# Patient Record
Sex: MALE | Race: WHITE | Employment: FULL TIME | URBAN - METROPOLITAN AREA
[De-identification: names, ages, dates, MRNs, and addresses within clinical notes are randomized per-mention and may not be internally consistent; named-entity substitution may affect disease eponyms.]

---

## 2024-07-09 ENCOUNTER — HOSPITAL ENCOUNTER (EMERGENCY)
Age: 34
Discharge: HOME | End: 2024-07-09
Attending: EMERGENCY MEDICINE

## 2024-07-09 ENCOUNTER — APPOINTMENT (OUTPATIENT)
Dept: GENERAL RADIOLOGY | Age: 34
End: 2024-07-09

## 2024-07-09 VITALS
OXYGEN SATURATION: 97 % | HEIGHT: 72 IN | BODY MASS INDEX: 24.38 KG/M2 | HEART RATE: 74 BPM | SYSTOLIC BLOOD PRESSURE: 134 MMHG | RESPIRATION RATE: 18 BRPM | TEMPERATURE: 98.1 F | DIASTOLIC BLOOD PRESSURE: 103 MMHG | WEIGHT: 180 LBS

## 2024-07-09 DIAGNOSIS — M10.9 ACUTE GOUT OF LEFT ANKLE, UNSPECIFIED CAUSE: Primary | ICD-10-CM

## 2024-07-09 LAB
ALBUMIN SERPL-MCNC: 4.2 G/DL (ref 3.5–5.2)
ALP SERPL-CCNC: 95 U/L (ref 40–129)
ALT SERPL-CCNC: 34 U/L (ref 0–40)
ANION GAP SERPL CALCULATED.3IONS-SCNC: 12 MMOL/L (ref 7–16)
AST SERPL-CCNC: 43 U/L (ref 0–39)
BILIRUB SERPL-MCNC: 0.8 MG/DL (ref 0–1.2)
BUN SERPL-MCNC: 7 MG/DL (ref 6–20)
CALCIUM SERPL-MCNC: 9.7 MG/DL (ref 8.6–10.2)
CHLORIDE SERPL-SCNC: 101 MMOL/L (ref 98–107)
CO2 SERPL-SCNC: 24 MMOL/L (ref 22–29)
CREAT SERPL-MCNC: 0.6 MG/DL (ref 0.7–1.2)
ERYTHROCYTE [DISTWIDTH] IN BLOOD BY AUTOMATED COUNT: 12.7 % (ref 11.5–15)
GFR, ESTIMATED: >90 ML/MIN/1.73M2
GLUCOSE SERPL-MCNC: 119 MG/DL (ref 74–99)
HCT VFR BLD AUTO: 39.1 % (ref 37–54)
HGB BLD-MCNC: 13.9 G/DL (ref 12.5–16.5)
LACTATE BLDV-SCNC: 1.4 MMOL/L (ref 0.5–2.2)
MCH RBC QN AUTO: 34.8 PG (ref 26–35)
MCHC RBC AUTO-ENTMCNC: 35.5 G/DL (ref 32–34.5)
MCV RBC AUTO: 97.8 FL (ref 80–99.9)
PLATELET # BLD AUTO: 174 K/UL (ref 130–450)
PMV BLD AUTO: 9.6 FL (ref 7–12)
POTASSIUM SERPL-SCNC: 3.9 MMOL/L (ref 3.5–5)
PROT SERPL-MCNC: 7 G/DL (ref 6.4–8.3)
RBC # BLD AUTO: 4 M/UL (ref 3.8–5.8)
SODIUM SERPL-SCNC: 137 MMOL/L (ref 132–146)
URATE SERPL-MCNC: 7.8 MG/DL (ref 3.4–7)
WBC OTHER # BLD: 8.8 K/UL (ref 4.5–11.5)

## 2024-07-09 PROCEDURE — 6360000002 HC RX W HCPCS

## 2024-07-09 PROCEDURE — 73610 X-RAY EXAM OF ANKLE: CPT

## 2024-07-09 PROCEDURE — 99284 EMERGENCY DEPT VISIT MOD MDM: CPT

## 2024-07-09 PROCEDURE — 96372 THER/PROPH/DIAG INJ SC/IM: CPT

## 2024-07-09 PROCEDURE — 84550 ASSAY OF BLOOD/URIC ACID: CPT

## 2024-07-09 PROCEDURE — 80053 COMPREHEN METABOLIC PANEL: CPT

## 2024-07-09 PROCEDURE — 6370000000 HC RX 637 (ALT 250 FOR IP)

## 2024-07-09 PROCEDURE — 73562 X-RAY EXAM OF KNEE 3: CPT

## 2024-07-09 PROCEDURE — 83605 ASSAY OF LACTIC ACID: CPT

## 2024-07-09 PROCEDURE — 85027 COMPLETE CBC AUTOMATED: CPT

## 2024-07-09 PROCEDURE — 96374 THER/PROPH/DIAG INJ IV PUSH: CPT

## 2024-07-09 RX ORDER — FENTANYL CITRATE 50 UG/ML
50 INJECTION, SOLUTION INTRAMUSCULAR; INTRAVENOUS ONCE
Status: COMPLETED | OUTPATIENT
Start: 2024-07-09 | End: 2024-07-09

## 2024-07-09 RX ORDER — PREDNISONE 20 MG/1
40 TABLET ORAL DAILY
Qty: 10 TABLET | Refills: 0 | Status: SHIPPED | OUTPATIENT
Start: 2024-07-09 | End: 2024-07-14

## 2024-07-09 RX ORDER — LORAZEPAM 2 MG/ML
2 INJECTION INTRAMUSCULAR EVERY 6 HOURS PRN
Status: DISCONTINUED | OUTPATIENT
Start: 2024-07-09 | End: 2024-07-09 | Stop reason: HOSPADM

## 2024-07-09 RX ORDER — PREDNISONE 20 MG/1
40 TABLET ORAL ONCE
Status: COMPLETED | OUTPATIENT
Start: 2024-07-09 | End: 2024-07-09

## 2024-07-09 RX ORDER — KETOROLAC TROMETHAMINE 30 MG/ML
30 INJECTION, SOLUTION INTRAMUSCULAR; INTRAVENOUS ONCE
Status: COMPLETED | OUTPATIENT
Start: 2024-07-09 | End: 2024-07-09

## 2024-07-09 RX ADMIN — KETOROLAC TROMETHAMINE 30 MG: 30 INJECTION INTRAMUSCULAR; INTRAVENOUS at 08:52

## 2024-07-09 RX ADMIN — FENTANYL CITRATE 50 MCG: 50 INJECTION INTRAMUSCULAR; INTRAVENOUS at 12:14

## 2024-07-09 RX ADMIN — PREDNISONE 40 MG: 20 TABLET ORAL at 14:22

## 2024-07-09 ASSESSMENT — PAIN - FUNCTIONAL ASSESSMENT: PAIN_FUNCTIONAL_ASSESSMENT: 0-10

## 2024-07-09 ASSESSMENT — LIFESTYLE VARIABLES
HOW MANY STANDARD DRINKS CONTAINING ALCOHOL DO YOU HAVE ON A TYPICAL DAY: PATIENT DOES NOT DRINK
HOW OFTEN DO YOU HAVE A DRINK CONTAINING ALCOHOL: NEVER

## 2024-07-09 ASSESSMENT — PAIN DESCRIPTION - DESCRIPTORS: DESCRIPTORS: SQUEEZING

## 2024-07-09 ASSESSMENT — PAIN DESCRIPTION - ORIENTATION: ORIENTATION: LEFT

## 2024-07-09 ASSESSMENT — PAIN DESCRIPTION - LOCATION: LOCATION: ANKLE

## 2024-07-09 ASSESSMENT — PAIN SCALES - GENERAL: PAINLEVEL_OUTOF10: 8

## 2024-07-09 NOTE — ED PROVIDER NOTES
[facility] EMERGENCY DEPARTMENT  EMERGENCY DEPARTMENT ENCOUNTER        Pt Name: Alex Brumfield  MRN: [id number]  Birthdate 1990  Date of evaluation: 7/9/2024  Provider: Castro Menjivar DO  PCP: No primary care provider on file.  Note Started: 8:47 AM EDT 7/9/24    CHIEF COMPLAINT       Chief Complaint   Patient presents with    Ankle Pain     Left ankle injury stated he rolled it 3 days ago and the pain is getting worse 8/10 pain        HISTORY OF PRESENT ILLNESS: 1 or more Elements   History From: PATIENT     Limitations to history : None    Alex Brumfiedl is a 33 y.o. male arriving with a complaint of left ankle pain.  The patient states he rolled his ankle 3 days ago and was initially able to bear weight but as he woke up this morning the pain was worse and he cannot bear weight.  Patient has broken both ankles in the past.  He is also feeling shaky however he admits to drinking up to 8 beers daily and his last drink was 2 days ago.      Nursing Notes were all reviewed and agreed with or any disagreements were addressed in the HPI.    REVIEW OF SYSTEMS :      Review of Systems    POSITIVE (+): Joint pain, tremors  NEGATIVE (-): fevers, chills, nausea, vomiting, diarrhea, constipation, shortness of breath, chest pain, abdominal pain      SURGICAL HISTORY     Past Surgical History:   Procedure Laterality Date    HERNIA REPAIR      TESTICLE SURGERY         CURRENTMEDICATIONS       Discharge Medication List as of 7/9/2024  2:34 PM          ALLERGIES     Patient has no known allergies.    FAMILYHISTORY     History reviewed. No pertinent family history.     SOCIAL HISTORY       Social History     Tobacco Use    Smoking status: Every Day     Current packs/day: 1.00     Types: Cigarettes    Smokeless tobacco: Never   Vaping Use    Vaping Use: Never used   Substance Use Topics    Alcohol use: Yes     Alcohol/week: 32.0 standard drinks of alcohol     Types: 32 Cans of beer per week

## 2025-01-21 ENCOUNTER — APPOINTMENT (OUTPATIENT)
Dept: CT IMAGING | Age: 35
End: 2025-01-21

## 2025-01-21 ENCOUNTER — HOSPITAL ENCOUNTER (EMERGENCY)
Age: 35
Discharge: HOME | End: 2025-01-22
Attending: EMERGENCY MEDICINE

## 2025-01-21 VITALS
SYSTOLIC BLOOD PRESSURE: 146 MMHG | WEIGHT: 180 LBS | TEMPERATURE: 98.1 F | DIASTOLIC BLOOD PRESSURE: 94 MMHG | HEIGHT: 72 IN | HEART RATE: 84 BPM | OXYGEN SATURATION: 99 % | BODY MASS INDEX: 24.38 KG/M2 | RESPIRATION RATE: 18 BRPM

## 2025-01-21 DIAGNOSIS — R56.9 SEIZURE (HCC): ICD-10-CM

## 2025-01-21 DIAGNOSIS — F10.939 ALCOHOL WITHDRAWAL SYNDROME WITH COMPLICATION (HCC): Primary | ICD-10-CM

## 2025-01-21 LAB
ALBUMIN SERPL-MCNC: 4.5 G/DL (ref 3.5–5.2)
ALP SERPL-CCNC: 89 U/L (ref 40–129)
ALT SERPL-CCNC: 72 U/L (ref 0–40)
ANION GAP SERPL CALCULATED.3IONS-SCNC: 18 MMOL/L (ref 7–16)
APAP SERPL-MCNC: <5 UG/ML (ref 10–30)
AST SERPL-CCNC: 95 U/L (ref 0–39)
BILIRUB SERPL-MCNC: 0.5 MG/DL (ref 0–1.2)
BUN SERPL-MCNC: 7 MG/DL (ref 6–20)
CALCIUM SERPL-MCNC: 9.4 MG/DL (ref 8.6–10.2)
CHLORIDE SERPL-SCNC: 97 MMOL/L (ref 98–107)
CO2 SERPL-SCNC: 22 MMOL/L (ref 22–29)
CREAT SERPL-MCNC: 0.7 MG/DL (ref 0.7–1.2)
ERYTHROCYTE [DISTWIDTH] IN BLOOD BY AUTOMATED COUNT: 12.6 % (ref 11.5–15)
ETHANOLAMINE SERPL-MCNC: <10 MG/DL (ref 0–0.08)
GFR, ESTIMATED: >90 ML/MIN/1.73M2
GLUCOSE SERPL-MCNC: 156 MG/DL (ref 74–99)
HCT VFR BLD AUTO: 41.4 % (ref 37–54)
HGB BLD-MCNC: 14.5 G/DL (ref 12.5–16.5)
MAGNESIUM SERPL-MCNC: 1.4 MG/DL (ref 1.6–2.6)
MCH RBC QN AUTO: 36.3 PG (ref 26–35)
MCHC RBC AUTO-ENTMCNC: 35 G/DL (ref 32–34.5)
MCV RBC AUTO: 103.8 FL (ref 80–99.9)
PLATELET # BLD AUTO: 170 K/UL (ref 130–450)
PMV BLD AUTO: 10.1 FL (ref 7–12)
POTASSIUM SERPL-SCNC: 3.5 MMOL/L (ref 3.5–5)
PROT SERPL-MCNC: 7.2 G/DL (ref 6.4–8.3)
RBC # BLD AUTO: 3.99 M/UL (ref 3.8–5.8)
SALICYLATES SERPL-MCNC: <0.3 MG/DL (ref 0–30)
SODIUM SERPL-SCNC: 137 MMOL/L (ref 132–146)
TOXIC TRICYCLIC SC,BLOOD: NEGATIVE
WBC OTHER # BLD: 5.1 K/UL (ref 4.5–11.5)

## 2025-01-21 PROCEDURE — 80143 DRUG ASSAY ACETAMINOPHEN: CPT

## 2025-01-21 PROCEDURE — 83735 ASSAY OF MAGNESIUM: CPT

## 2025-01-21 PROCEDURE — 6360000002 HC RX W HCPCS

## 2025-01-21 PROCEDURE — 70450 CT HEAD/BRAIN W/O DYE: CPT

## 2025-01-21 PROCEDURE — 80053 COMPREHEN METABOLIC PANEL: CPT

## 2025-01-21 PROCEDURE — 96366 THER/PROPH/DIAG IV INF ADDON: CPT

## 2025-01-21 PROCEDURE — 6360000002 HC RX W HCPCS: Performed by: EMERGENCY MEDICINE

## 2025-01-21 PROCEDURE — 85027 COMPLETE CBC AUTOMATED: CPT

## 2025-01-21 PROCEDURE — G0480 DRUG TEST DEF 1-7 CLASSES: HCPCS

## 2025-01-21 PROCEDURE — 99284 EMERGENCY DEPT VISIT MOD MDM: CPT

## 2025-01-21 PROCEDURE — 6370000000 HC RX 637 (ALT 250 FOR IP): Performed by: EMERGENCY MEDICINE

## 2025-01-21 PROCEDURE — 96375 TX/PRO/DX INJ NEW DRUG ADDON: CPT

## 2025-01-21 PROCEDURE — 80307 DRUG TEST PRSMV CHEM ANLYZR: CPT

## 2025-01-21 PROCEDURE — 96365 THER/PROPH/DIAG IV INF INIT: CPT

## 2025-01-21 PROCEDURE — 2580000003 HC RX 258: Performed by: EMERGENCY MEDICINE

## 2025-01-21 PROCEDURE — 80179 DRUG ASSAY SALICYLATE: CPT

## 2025-01-21 RX ORDER — SODIUM CHLORIDE 9 MG/ML
INJECTION, SOLUTION INTRAVENOUS PRN
Status: DISCONTINUED | OUTPATIENT
Start: 2025-01-21 | End: 2025-01-22 | Stop reason: HOSPADM

## 2025-01-21 RX ORDER — LORAZEPAM 2 MG/ML
4 INJECTION INTRAMUSCULAR
Status: DISCONTINUED | OUTPATIENT
Start: 2025-01-21 | End: 2025-01-22 | Stop reason: HOSPADM

## 2025-01-21 RX ORDER — FOLIC ACID 1 MG/1
1 TABLET ORAL DAILY
Status: DISCONTINUED | OUTPATIENT
Start: 2025-01-21 | End: 2025-01-22 | Stop reason: HOSPADM

## 2025-01-21 RX ORDER — SODIUM CHLORIDE 0.9 % (FLUSH) 0.9 %
5-40 SYRINGE (ML) INJECTION PRN
Status: DISCONTINUED | OUTPATIENT
Start: 2025-01-21 | End: 2025-01-22 | Stop reason: HOSPADM

## 2025-01-21 RX ORDER — LANOLIN ALCOHOL/MO/W.PET/CERES
100 CREAM (GRAM) TOPICAL DAILY
Status: DISCONTINUED | OUTPATIENT
Start: 2025-01-21 | End: 2025-01-22 | Stop reason: HOSPADM

## 2025-01-21 RX ORDER — LORAZEPAM 1 MG/1
1 TABLET ORAL
Status: DISCONTINUED | OUTPATIENT
Start: 2025-01-21 | End: 2025-01-22 | Stop reason: HOSPADM

## 2025-01-21 RX ORDER — LORAZEPAM 1 MG/1
4 TABLET ORAL
Status: DISCONTINUED | OUTPATIENT
Start: 2025-01-21 | End: 2025-01-22 | Stop reason: HOSPADM

## 2025-01-21 RX ORDER — ACETAMINOPHEN 325 MG/1
650 TABLET ORAL ONCE
Status: COMPLETED | OUTPATIENT
Start: 2025-01-21 | End: 2025-01-21

## 2025-01-21 RX ORDER — LORAZEPAM 2 MG/ML
3 INJECTION INTRAMUSCULAR
Status: DISCONTINUED | OUTPATIENT
Start: 2025-01-21 | End: 2025-01-22 | Stop reason: HOSPADM

## 2025-01-21 RX ORDER — LORAZEPAM 2 MG/ML
1 INJECTION INTRAMUSCULAR
Status: DISCONTINUED | OUTPATIENT
Start: 2025-01-21 | End: 2025-01-22 | Stop reason: HOSPADM

## 2025-01-21 RX ORDER — LORAZEPAM 1 MG/1
2 TABLET ORAL
Status: DISCONTINUED | OUTPATIENT
Start: 2025-01-21 | End: 2025-01-22 | Stop reason: HOSPADM

## 2025-01-21 RX ORDER — SODIUM CHLORIDE 0.9 % (FLUSH) 0.9 %
5-40 SYRINGE (ML) INJECTION EVERY 12 HOURS SCHEDULED
Status: DISCONTINUED | OUTPATIENT
Start: 2025-01-21 | End: 2025-01-22 | Stop reason: HOSPADM

## 2025-01-21 RX ORDER — LORAZEPAM 1 MG/1
3 TABLET ORAL
Status: DISCONTINUED | OUTPATIENT
Start: 2025-01-21 | End: 2025-01-22 | Stop reason: HOSPADM

## 2025-01-21 RX ORDER — DEXTROSE MONOHYDRATE AND SODIUM CHLORIDE 5; .45 G/100ML; G/100ML
INJECTION, SOLUTION INTRAVENOUS CONTINUOUS
Status: DISCONTINUED | OUTPATIENT
Start: 2025-01-21 | End: 2025-01-22 | Stop reason: HOSPADM

## 2025-01-21 RX ORDER — LORAZEPAM 2 MG/ML
2 INJECTION INTRAMUSCULAR ONCE
Status: COMPLETED | OUTPATIENT
Start: 2025-01-21 | End: 2025-01-21

## 2025-01-21 RX ORDER — MAGNESIUM SULFATE IN WATER 40 MG/ML
2000 INJECTION, SOLUTION INTRAVENOUS ONCE
Status: COMPLETED | OUTPATIENT
Start: 2025-01-21 | End: 2025-01-21

## 2025-01-21 RX ORDER — LORAZEPAM 2 MG/ML
2 INJECTION INTRAMUSCULAR
Status: DISCONTINUED | OUTPATIENT
Start: 2025-01-21 | End: 2025-01-22 | Stop reason: HOSPADM

## 2025-01-21 RX ORDER — 0.9 % SODIUM CHLORIDE 0.9 %
1000 INTRAVENOUS SOLUTION INTRAVENOUS ONCE
Status: COMPLETED | OUTPATIENT
Start: 2025-01-21 | End: 2025-01-21

## 2025-01-21 RX ADMIN — LORAZEPAM 2 MG: 2 INJECTION INTRAMUSCULAR; INTRAVENOUS at 20:41

## 2025-01-21 RX ADMIN — SODIUM CHLORIDE 1000 ML: 9 INJECTION, SOLUTION INTRAVENOUS at 20:43

## 2025-01-21 RX ADMIN — Medication 100 MG: at 19:28

## 2025-01-21 RX ADMIN — FOLIC ACID 1 MG: 1 TABLET ORAL at 19:28

## 2025-01-21 RX ADMIN — MAGNESIUM SULFATE HEPTAHYDRATE 2000 MG: 40 INJECTION, SOLUTION INTRAVENOUS at 20:34

## 2025-01-21 RX ADMIN — ACETAMINOPHEN 650 MG: 325 TABLET ORAL at 20:44

## 2025-01-21 ASSESSMENT — PAIN DESCRIPTION - ORIENTATION: ORIENTATION: POSTERIOR

## 2025-01-21 ASSESSMENT — PAIN SCALES - GENERAL: PAINLEVEL_OUTOF10: 6

## 2025-01-21 ASSESSMENT — PAIN - FUNCTIONAL ASSESSMENT: PAIN_FUNCTIONAL_ASSESSMENT: NONE - DENIES PAIN

## 2025-01-21 ASSESSMENT — LIFESTYLE VARIABLES
HOW MANY STANDARD DRINKS CONTAINING ALCOHOL DO YOU HAVE ON A TYPICAL DAY: 10 OR MORE
HOW OFTEN DO YOU HAVE A DRINK CONTAINING ALCOHOL: 4 OR MORE TIMES A WEEK

## 2025-01-21 ASSESSMENT — PAIN DESCRIPTION - LOCATION: LOCATION: HEAD

## 2025-01-21 ASSESSMENT — PAIN DESCRIPTION - DESCRIPTORS: DESCRIPTORS: STABBING

## 2025-01-22 RX ORDER — CHLORDIAZEPOXIDE HYDROCHLORIDE 25 MG/1
25 CAPSULE, GELATIN COATED ORAL 3 TIMES DAILY PRN
Qty: 90 CAPSULE | Refills: 0 | Status: SHIPPED | OUTPATIENT
Start: 2025-01-22 | End: 2025-02-21

## 2025-01-22 NOTE — DISCHARGE INSTRUCTIONS
Return to the ER if you have repeated seizures, fever, vomiting, neck stiffness, numbness or weakness in your arms or legs, or any other new or concerning symptoms      A Quick Guide to Finding   Effective Alcohol and Drug Addiction Treatment  For a complete list of treatment centers in [facility] please vist the “[facility]  [place]” website  Http://[email or web address] [email or web address] [email or web address]/Toolkits/Patients    If you or someone you care for is dependent on alcohol or drugs and needs treatment, it is important to know this; no single treatment approach is appropriate for all individuals. Finding the right treatment program involves careful consideration of such things as:  Setting.  Length of care. Philosophical approach.  You or your loved one's needs.   12 QUESTIONS TO CONSIDER WHEN SELECTING A TREATMENT PROGRAM:  Does the program accept your insurance? If not:  Will it work with you on a payment plan? Will it find other means of support for you?  Is the program run by state-accredited, licensed and/or trained professionals?  Is the facility clean, organized and well-run?  Does the program meet the full range of needs of the individual?  Medical, including infectious diseases.  Psychological, including co-occurring mental illness.  Social, vocational, legal. etc.  Does the treatment program also address sexual orientation and physical disabilities as well as provide age, gender and culturally appropriate treatment services?  Is long-term aftercare support and/or guidance encouraged and provided?  Is assessment of an individual's treatment plan ongoing to ensure it meets changing needs?  Does the program use strategies to engage and keep individuals in longer-term treatment? This would increase the likelihood of success?  Does the program offer counseling (individual or group) and other behavioral therapies to enhance the individual's ability to function in the family/community?  Does the program offer medication as part of the

## 2025-06-23 ENCOUNTER — APPOINTMENT (OUTPATIENT)
Dept: GENERAL RADIOLOGY | Age: 35
End: 2025-06-23
Payer: COMMERCIAL

## 2025-06-23 ENCOUNTER — HOSPITAL ENCOUNTER (EMERGENCY)
Age: 35
Discharge: HOME | End: 2025-06-24
Payer: COMMERCIAL

## 2025-06-23 DIAGNOSIS — M25.472 ANKLE JOINT EFFUSION, LEFT: ICD-10-CM

## 2025-06-23 DIAGNOSIS — T56.0X1A LEAD-INDUCED ACUTE GOUT OF LEFT ANKLE, INITIAL ENCOUNTER: ICD-10-CM

## 2025-06-23 DIAGNOSIS — M10.172 LEAD-INDUCED ACUTE GOUT OF LEFT ANKLE, INITIAL ENCOUNTER: ICD-10-CM

## 2025-06-23 DIAGNOSIS — M25.461 EFFUSION OF KNEE JOINT RIGHT: Primary | ICD-10-CM

## 2025-06-23 LAB
ANION GAP SERPL CALCULATED.3IONS-SCNC: 14 MMOL/L (ref 7–16)
BASOPHILS # BLD: 0.07 K/UL (ref 0–0.2)
BASOPHILS NFR BLD: 1 % (ref 0–2)
BUN SERPL-MCNC: 7 MG/DL (ref 6–20)
CALCIUM SERPL-MCNC: 8.3 MG/DL (ref 8.6–10.2)
CHLORIDE SERPL-SCNC: 102 MMOL/L (ref 98–107)
CO2 SERPL-SCNC: 24 MMOL/L (ref 22–29)
CREAT SERPL-MCNC: 0.6 MG/DL (ref 0.7–1.2)
EOSINOPHIL # BLD: 0.17 K/UL (ref 0.05–0.5)
EOSINOPHILS RELATIVE PERCENT: 2 % (ref 0–6)
ERYTHROCYTE [DISTWIDTH] IN BLOOD BY AUTOMATED COUNT: 13.5 % (ref 11.5–15)
GFR, ESTIMATED: >90 ML/MIN/1.73M2
GLUCOSE SERPL-MCNC: 115 MG/DL (ref 74–99)
HCT VFR BLD AUTO: 33.7 % (ref 37–54)
HGB BLD-MCNC: 12.2 G/DL (ref 12.5–16.5)
IMM GRANULOCYTES # BLD AUTO: 0.04 K/UL (ref 0–0.58)
IMM GRANULOCYTES NFR BLD: 0 % (ref 0–5)
LYMPHOCYTES NFR BLD: 1.59 K/UL (ref 1.5–4)
LYMPHOCYTES RELATIVE PERCENT: 18 % (ref 20–42)
MCH RBC QN AUTO: 37.2 PG (ref 26–35)
MCHC RBC AUTO-ENTMCNC: 36.2 G/DL (ref 32–34.5)
MCV RBC AUTO: 102.7 FL (ref 80–99.9)
MONOCYTES NFR BLD: 1.63 K/UL (ref 0.1–0.95)
MONOCYTES NFR BLD: 18 % (ref 2–12)
NEUTROPHILS NFR BLD: 61 % (ref 43–80)
NEUTS SEG NFR BLD: 5.56 K/UL (ref 1.8–7.3)
PLATELET # BLD AUTO: 249 K/UL (ref 130–450)
PMV BLD AUTO: 9.9 FL (ref 7–12)
POTASSIUM SERPL-SCNC: 3.6 MMOL/L (ref 3.5–5)
RBC # BLD AUTO: 3.28 M/UL (ref 3.8–5.8)
RBC # BLD: ABNORMAL 10*6/UL
SODIUM SERPL-SCNC: 140 MMOL/L (ref 132–146)
WBC OTHER # BLD: 9.1 K/UL (ref 4.5–11.5)

## 2025-06-23 PROCEDURE — 86140 C-REACTIVE PROTEIN: CPT

## 2025-06-23 PROCEDURE — 73562 X-RAY EXAM OF KNEE 3: CPT

## 2025-06-23 PROCEDURE — 84550 ASSAY OF BLOOD/URIC ACID: CPT

## 2025-06-23 PROCEDURE — 85652 RBC SED RATE AUTOMATED: CPT

## 2025-06-23 PROCEDURE — 99284 EMERGENCY DEPT VISIT MOD MDM: CPT

## 2025-06-23 PROCEDURE — 73610 X-RAY EXAM OF ANKLE: CPT

## 2025-06-23 PROCEDURE — 10160 PNXR ASPIR ABSC HMTMA BULLA: CPT

## 2025-06-23 PROCEDURE — 85025 COMPLETE CBC W/AUTO DIFF WBC: CPT

## 2025-06-23 PROCEDURE — 80048 BASIC METABOLIC PNL TOTAL CA: CPT

## 2025-06-23 ASSESSMENT — LIFESTYLE VARIABLES
HOW MANY STANDARD DRINKS CONTAINING ALCOHOL DO YOU HAVE ON A TYPICAL DAY: 3 OR 4
HOW OFTEN DO YOU HAVE A DRINK CONTAINING ALCOHOL: 2-4 TIMES A MONTH

## 2025-06-24 VITALS
SYSTOLIC BLOOD PRESSURE: 128 MMHG | OXYGEN SATURATION: 100 % | HEIGHT: 72 IN | HEART RATE: 75 BPM | WEIGHT: 175 LBS | DIASTOLIC BLOOD PRESSURE: 94 MMHG | RESPIRATION RATE: 16 BRPM | TEMPERATURE: 98.4 F | BODY MASS INDEX: 23.7 KG/M2

## 2025-06-24 LAB
APPEARANCE: ABNORMAL
BODY FLD TYPE: ABNORMAL
CLOT CHECK: ABNORMAL
COLOR FLUID: YELLOW
CRP SERPL HS-MCNC: 24.6 MG/L (ref 0–5)
ERYTHROCYTE [SEDIMENTATION RATE] IN BLOOD BY WESTERGREN METHOD: 15 MM/HR (ref 0–15)
MONO/MACROPHAGE FLUID: 19 %
NEUTROPHIL, FLUID: 81 %
RBC, SYNOVIAL FLUID: <2000 CELLS/UL
URATE SERPL-MCNC: 7 MG/DL (ref 3.4–7)
WBC COUNT, SYNOVIAL FLUID: 4626 CELLS/UL

## 2025-06-24 PROCEDURE — 6360000002 HC RX W HCPCS: Performed by: PHYSICIAN ASSISTANT

## 2025-06-24 PROCEDURE — 96374 THER/PROPH/DIAG INJ IV PUSH: CPT

## 2025-06-24 PROCEDURE — 20610 DRAIN/INJ JOINT/BURSA W/O US: CPT

## 2025-06-24 PROCEDURE — 6370000000 HC RX 637 (ALT 250 FOR IP): Performed by: PHYSICIAN ASSISTANT

## 2025-06-24 PROCEDURE — 89051 BODY FLUID CELL COUNT: CPT

## 2025-06-24 PROCEDURE — 87070 CULTURE OTHR SPECIMN AEROBIC: CPT

## 2025-06-24 PROCEDURE — 87205 SMEAR GRAM STAIN: CPT

## 2025-06-24 PROCEDURE — 89060 EXAM SYNOVIAL FLUID CRYSTALS: CPT

## 2025-06-24 RX ORDER — HYDROCODONE BITARTRATE AND ACETAMINOPHEN 5; 325 MG/1; MG/1
1 TABLET ORAL ONCE
Status: COMPLETED | OUTPATIENT
Start: 2025-06-24 | End: 2025-06-24

## 2025-06-24 RX ORDER — LIDOCAINE HYDROCHLORIDE 10 MG/ML
10 INJECTION, SOLUTION EPIDURAL; INFILTRATION; INTRACAUDAL; PERINEURAL ONCE
Status: COMPLETED | OUTPATIENT
Start: 2025-06-24 | End: 2025-06-24

## 2025-06-24 RX ORDER — CEPHALEXIN 500 MG/1
500 CAPSULE ORAL 3 TIMES DAILY
Qty: 21 CAPSULE | Refills: 0 | Status: SHIPPED | OUTPATIENT
Start: 2025-06-24 | End: 2025-07-01

## 2025-06-24 RX ORDER — PREDNISONE 10 MG/1
40 TABLET ORAL DAILY
Qty: 20 TABLET | Refills: 0 | Status: SHIPPED | OUTPATIENT
Start: 2025-06-24 | End: 2025-06-29

## 2025-06-24 RX ORDER — COLCHICINE 0.6 MG/1
1.2 TABLET ORAL ONCE
Status: COMPLETED | OUTPATIENT
Start: 2025-06-24 | End: 2025-06-24

## 2025-06-24 RX ORDER — IBUPROFEN 800 MG/1
800 TABLET, FILM COATED ORAL EVERY 6 HOURS PRN
Qty: 20 TABLET | Refills: 0 | Status: SHIPPED | OUTPATIENT
Start: 2025-06-24 | End: 2025-06-29

## 2025-06-24 RX ORDER — DEXAMETHASONE SODIUM PHOSPHATE 4 MG/ML
4 INJECTION, SOLUTION INTRA-ARTICULAR; INTRALESIONAL; INTRAMUSCULAR; INTRAVENOUS; SOFT TISSUE ONCE
Status: COMPLETED | OUTPATIENT
Start: 2025-06-24 | End: 2025-06-24

## 2025-06-24 RX ORDER — HYDROCODONE BITARTRATE AND ACETAMINOPHEN 5; 325 MG/1; MG/1
1 TABLET ORAL EVERY 6 HOURS PRN
Qty: 10 TABLET | Refills: 0 | Status: SHIPPED | OUTPATIENT
Start: 2025-06-24 | End: 2025-06-27

## 2025-06-24 RX ADMIN — DEXAMETHASONE SODIUM PHOSPHATE 4 MG: 4 INJECTION INTRA-ARTICULAR; INTRALESIONAL; INTRAMUSCULAR; INTRAVENOUS; SOFT TISSUE at 01:42

## 2025-06-24 RX ADMIN — LIDOCAINE HYDROCHLORIDE 10 ML: 10 INJECTION, SOLUTION EPIDURAL; INFILTRATION; INTRACAUDAL; PERINEURAL at 01:42

## 2025-06-24 RX ADMIN — COLCHICINE 1.2 MG: 0.6 TABLET ORAL at 03:31

## 2025-06-24 RX ADMIN — HYDROCODONE BITARTRATE AND ACETAMINOPHEN 1 TABLET: 5; 325 TABLET ORAL at 03:31

## 2025-06-24 RX ADMIN — HYDROCODONE BITARTRATE AND ACETAMINOPHEN 1 TABLET: 5; 325 TABLET ORAL at 01:42

## 2025-06-24 ASSESSMENT — PAIN SCALES - GENERAL
PAINLEVEL_OUTOF10: 5
PAINLEVEL_OUTOF10: 8

## 2025-06-24 ASSESSMENT — PAIN DESCRIPTION - LOCATION
LOCATION: ANKLE;KNEE
LOCATION: ANKLE

## 2025-06-24 NOTE — ED PROVIDER NOTES
Independent [facility] Visit.    [facility]  Department of Emergency Medicine   ED  Encounter Note  Admit Date/RoomTime: 2025  9:47 PM  ED Room: 10/10    NAME: Alex Brumfield  : 1990  MRN: [id number]     Chief Complaint:  Ankle Pain (Left ankle pain and swelling since this morning. Unable to bear weight) and Joint Swelling    History of Present Illness       Alex Brumfield is a 34 y.o. old male who presents to the emergency department by private vehicle, for non-traumatic pain located right knee and left ankle and swelling which occured a few hour(s) prior to arrival.  The complaint is due to no known cause but patient sister reports he was drinking this weekend with her  while they were doing a lot of work around the property.  He does drink quite a bit but had not been drinking since the previous Monday.  He has a history of gout reports he has about one attack per month.  He reports he had a little pain in the ankle yesterday but not significant and woke up with same mild pain. By this afternoon pain and swelling rapidly increased  Since onset the symptoms have been rapidly worsening.  Pt has hx of gout, usually in great toe of left foot.  His pain is aggraveated by any movement, pressure on or palpation of painful area, or weight bearing and relieved by nothing, as no treatment has been provided prior to this visit. His weight bearing ability is: difficult secondary to discomfort. He denies any extremity injury, numbness, fever, chills, wounds, or rash.     ROS   Pertinent positives and negatives are stated within HPI, all other systems reviewed and are negative.    Past Medical History:  has no past medical history on file.    Surgical History:  has a past surgical history that includes hernia repair and Testicle surgery.    Social History:  reports that he has been smoking cigarettes. He has never used smokeless tobacco. He reports current alcohol use of about 32.0 standard drinks of  appropriately and the landmarks were identified.  The skin was cleansed with povidone iodine and draped in a sterile fashion.  Local Anesthesia:  obtained with Lidocaine 1% without epinephrine.  Arthrocentesis:  right knee aspiration was performed using a 18 gauge needle and 60 cc of yellow, turbid fluid was drained, negative string test.  Culture sent:  yes.  A sterile dressing was then applied to the site.  Complications: None.  The patient tolerated the procedure well.     .    MDM:   DDX: gout, inflammatory arthritis, injury, septic joint:  Alex Brumfield is a 34 y.o. old male who presents to the emergency department by private vehicle, for non-traumatic pain located right knee and left ankle and swelling which occured a few hour(s) prior to arrival.  The complaint is due to no known cause but patient sister reports he was drinking this weekend with her  while they were doing a lot of work around the property.  He does drink quite a bit but had not been drinking since the previous Monday.  He has a history of gout reports he has about one attack per month.  He reports he had a little pain in the ankle yesterday but not significant and woke up with same mild pain. By this afternoon pain and swelling rapidly increased  Since onset the symptoms have been rapidly worsening.  Pt has hx of gout, usually in great toe of left foot.  His pain is aggraveated by any movement, pressure on or palpation of painful area, or weight bearing and relieved by nothing, as no treatment has been provided prior to this visit. His weight bearing ability is: difficult secondary to discomfort. He denies any extremity injury, numbness, fever, chills, wounds, or rash.   Imaging was obtained based on high suspicion for joint effusion as per history/physical findings.  Bony structures are intact, patient has at least moderate suprapatellar knee joint effusion and redemonstration of left ankle effusion with increased tissue edema.  Pulses

## 2025-06-24 NOTE — ED NOTES
Call placed to labs re: CRP and Uric acid. Per TOMASA in lab machine that reads Uric Acid is down for maintenance at this time and CRP will be sent to [facility] at 0600. Maddie VIDALES notified.

## 2025-06-25 LAB
CRYSTALS FLD MICRO: NORMAL
PATH INTERP FLD-IMP: NORMAL
SPECIMEN TYPE: NORMAL

## 2025-06-29 LAB
MICROORGANISM SPEC CULT: NORMAL
MICROORGANISM/AGENT SPEC: NORMAL
SERVICE CMNT-IMP: NORMAL
SPECIMEN DESCRIPTION: NORMAL